# Patient Record
Sex: FEMALE | Race: WHITE | NOT HISPANIC OR LATINO | URBAN - METROPOLITAN AREA
[De-identification: names, ages, dates, MRNs, and addresses within clinical notes are randomized per-mention and may not be internally consistent; named-entity substitution may affect disease eponyms.]

---

## 2017-09-01 ENCOUNTER — APPOINTMENT (RX ONLY)
Dept: URBAN - METROPOLITAN AREA CLINIC 299 | Facility: CLINIC | Age: 42
Setting detail: DERMATOLOGY
End: 2017-09-01

## 2017-09-01 VITALS — HEIGHT: 64 IN | WEIGHT: 215 LBS

## 2017-09-01 DIAGNOSIS — L81.1 CHLOASMA: ICD-10-CM

## 2017-09-01 DIAGNOSIS — L20.89 OTHER ATOPIC DERMATITIS: ICD-10-CM

## 2017-09-01 PROBLEM — L70.0 ACNE VULGARIS: Status: ACTIVE | Noted: 2017-09-01

## 2017-09-01 PROBLEM — L20.84 INTRINSIC (ALLERGIC) ECZEMA: Status: ACTIVE | Noted: 2017-09-01

## 2017-09-01 PROCEDURE — ? PATIENT SPECIFIC COUNSELING

## 2017-09-01 PROCEDURE — ? COUNSELING

## 2017-09-01 PROCEDURE — ? PRESCRIPTION

## 2017-09-01 PROCEDURE — 99202 OFFICE O/P NEW SF 15 MIN: CPT

## 2017-09-01 PROCEDURE — ? OBSERVATION

## 2017-09-01 RX ORDER — TRIAMCINOLONE ACETONIDE 5 MG/G
OINTMENT TOPICAL
Qty: 3 | Refills: 3 | Status: ERX | COMMUNITY
Start: 2017-09-01

## 2017-09-01 RX ADMIN — TRIAMCINOLONE ACETONIDE: 5 OINTMENT TOPICAL at 15:52

## 2017-09-01 ASSESSMENT — LOCATION ZONE DERM
LOCATION ZONE: ARM
LOCATION ZONE: LEG
LOCATION ZONE: TRUNK
LOCATION ZONE: FACE

## 2017-09-01 ASSESSMENT — LOCATION DETAILED DESCRIPTION DERM
LOCATION DETAILED: RIGHT ANTERIOR PROXIMAL UPPER ARM
LOCATION DETAILED: LEFT ANTERIOR PROXIMAL UPPER ARM
LOCATION DETAILED: LEFT BUTTOCK
LOCATION DETAILED: LEFT POPLITEAL SKIN
LOCATION DETAILED: RIGHT POPLITEAL SKIN
LOCATION DETAILED: LEFT MEDIAL FOREHEAD

## 2017-09-01 ASSESSMENT — LOCATION SIMPLE DESCRIPTION DERM
LOCATION SIMPLE: LEFT POPLITEAL SKIN
LOCATION SIMPLE: RIGHT POPLITEAL SKIN
LOCATION SIMPLE: RIGHT UPPER ARM
LOCATION SIMPLE: LEFT BUTTOCK
LOCATION SIMPLE: LEFT FOREHEAD
LOCATION SIMPLE: LEFT UPPER ARM

## 2017-09-01 NOTE — PROCEDURE: MIPS QUALITY
Quality 402: Tobacco Use And Help With Quitting Among Adolescents: Patient screened for tobacco and never smoked
Quality 130: Documentation Of Current Medications In The Medical Record: Current Medications Documented
Quality 128: Preventive Care And Screening: Body Mass Index (Bmi) Screening And Follow-Up Plan: BMI is documented as being outside of normal limits, follow-up plan is not documented, documentation the patient is not eligible.
Detail Level: Detailed
Quality 431: Preventive Care And Screening: Unhealthy Alcohol Use - Screening: Patient screened for unhealthy alcohol use using a single question and scores less than 2 times per year

## 2017-09-01 NOTE — PROCEDURE: PATIENT SPECIFIC COUNSELING
Detail Level: Detailed
Pt will consider punch bx if not improved with tx, pt declines this for now. Recommended cream instead of lotion for moisturizer, and gentle, fragrance free products. D/C mupirocin and clotrimazole/betamethasone from [facility] in [place]
Recommended spa consult for laser tx. Pt will try hydroquinone lightening pads 6% from spa

## 2017-09-18 ENCOUNTER — APPOINTMENT (RX ONLY)
Dept: URBAN - METROPOLITAN AREA CLINIC 299 | Facility: CLINIC | Age: 42
Setting detail: DERMATOLOGY
End: 2017-09-18

## 2017-09-18 VITALS — WEIGHT: 220 LBS | HEIGHT: 64 IN

## 2017-09-18 DIAGNOSIS — L20.89 OTHER ATOPIC DERMATITIS: ICD-10-CM

## 2017-09-18 PROBLEM — L20.84 INTRINSIC (ALLERGIC) ECZEMA: Status: ACTIVE | Noted: 2017-09-18

## 2017-09-18 PROCEDURE — ? COUNSELING

## 2017-09-18 PROCEDURE — 99213 OFFICE O/P EST LOW 20 MIN: CPT

## 2017-09-18 PROCEDURE — ? PATIENT SPECIFIC COUNSELING

## 2017-09-18 ASSESSMENT — LOCATION ZONE DERM
LOCATION ZONE: ARM
LOCATION ZONE: TRUNK
LOCATION ZONE: LEG

## 2017-09-18 ASSESSMENT — LOCATION SIMPLE DESCRIPTION DERM
LOCATION SIMPLE: LEFT POPLITEAL SKIN
LOCATION SIMPLE: LEFT UPPER ARM
LOCATION SIMPLE: LEFT BUTTOCK
LOCATION SIMPLE: RIGHT POPLITEAL SKIN
LOCATION SIMPLE: RIGHT UPPER ARM

## 2017-09-18 ASSESSMENT — LOCATION DETAILED DESCRIPTION DERM
LOCATION DETAILED: RIGHT ANTERIOR PROXIMAL UPPER ARM
LOCATION DETAILED: RIGHT POPLITEAL SKIN
LOCATION DETAILED: LEFT ANTERIOR PROXIMAL UPPER ARM
LOCATION DETAILED: LEFT BUTTOCK
LOCATION DETAILED: LEFT POPLITEAL SKIN

## 2017-09-18 ASSESSMENT — SEVERITY ASSESSMENT: SEVERITY: ALMOST CLEAR

## 2017-09-18 NOTE — PROCEDURE: MIPS QUALITY
Quality 130: Documentation Of Current Medications In The Medical Record: Current Medications Documented
Quality 402: Tobacco Use And Help With Quitting Among Adolescents: Patient screened for tobacco and never smoked
Quality 431: Preventive Care And Screening: Unhealthy Alcohol Use - Screening: Patient screened for unhealthy alcohol use using a single question and scores less than 2 times per year
Detail Level: Detailed
Quality 128: Preventive Care And Screening: Body Mass Index (Bmi) Screening And Follow-Up Plan: BMI is documented as being outside of normal limits, follow-up plan is not documented, documentation the patient is not eligible.

## 2017-12-06 ENCOUNTER — APPOINTMENT (RX ONLY)
Dept: URBAN - METROPOLITAN AREA CLINIC 299 | Facility: CLINIC | Age: 42
Setting detail: DERMATOLOGY
End: 2017-12-06

## 2017-12-06 DIAGNOSIS — B00.1 HERPESVIRAL VESICULAR DERMATITIS: ICD-10-CM

## 2017-12-06 DIAGNOSIS — L20.89 OTHER ATOPIC DERMATITIS: ICD-10-CM

## 2017-12-06 PROBLEM — L20.84 INTRINSIC (ALLERGIC) ECZEMA: Status: ACTIVE | Noted: 2017-12-06

## 2017-12-06 PROCEDURE — ? COUNSELING

## 2017-12-06 PROCEDURE — 99213 OFFICE O/P EST LOW 20 MIN: CPT

## 2017-12-06 PROCEDURE — ? PRESCRIPTION

## 2017-12-06 PROCEDURE — ? PATIENT SPECIFIC COUNSELING

## 2017-12-06 RX ORDER — VALACYCLOVIR HYDROCHLORIDE 1 G/1
TABLET, FILM COATED ORAL
Qty: 30 | Refills: 2 | Status: ERX | COMMUNITY
Start: 2017-12-06

## 2017-12-06 RX ADMIN — VALACYCLOVIR HYDROCHLORIDE: 1 TABLET, FILM COATED ORAL at 21:13

## 2017-12-06 ASSESSMENT — LOCATION SIMPLE DESCRIPTION DERM
LOCATION SIMPLE: RIGHT LIP
LOCATION SIMPLE: LEFT BUTTOCK

## 2017-12-06 ASSESSMENT — LOCATION ZONE DERM
LOCATION ZONE: LIP
LOCATION ZONE: TRUNK

## 2017-12-06 ASSESSMENT — LOCATION DETAILED DESCRIPTION DERM
LOCATION DETAILED: LEFT BUTTOCK
LOCATION DETAILED: RIGHT SUPERIOR VERMILION LIP

## 2017-12-06 NOTE — PROCEDURE: PATIENT SPECIFIC COUNSELING
Detail Level: Zone
Patient uses TAC 0.5% cream prn which works well
Patient also uses acyclovir 5% ointment, no Rx needed today

## 2017-12-06 NOTE — PROCEDURE: MIPS QUALITY
Detail Level: Detailed
Quality 431: Preventive Care And Screening: Unhealthy Alcohol Use - Screening: Patient screened for unhealthy alcohol use using a single question and scores less than 2 times per year
Quality 110: Preventive Care And Screening: Influenza Immunization: Influenza immunization was not ordered or administered, reason not given
Quality 402: Tobacco Use And Help With Quitting Among Adolescents: Patient screened for tobacco and never smoked
Quality 130: Documentation Of Current Medications In The Medical Record: Current Medications Documented
Quality 128: Preventive Care And Screening: Body Mass Index (Bmi) Screening And Follow-Up Plan: BMI is documented as being outside of normal limits, follow-up plan is not documented, documentation the patient is not eligible.

## 2019-05-21 ENCOUNTER — APPOINTMENT (RX ONLY)
Dept: URBAN - METROPOLITAN AREA CLINIC 299 | Facility: CLINIC | Age: 44
Setting detail: DERMATOLOGY
End: 2019-05-21

## 2019-05-21 DIAGNOSIS — B00.1 HERPESVIRAL VESICULAR DERMATITIS: ICD-10-CM

## 2019-05-21 DIAGNOSIS — R21 RASH AND OTHER NONSPECIFIC SKIN ERUPTION: ICD-10-CM

## 2019-05-21 PROCEDURE — 99202 OFFICE O/P NEW SF 15 MIN: CPT | Mod: 25

## 2019-05-21 PROCEDURE — 11104 PUNCH BX SKIN SINGLE LESION: CPT

## 2019-05-21 PROCEDURE — ? COUNSELING

## 2019-05-21 PROCEDURE — ? PRESCRIPTION

## 2019-05-21 PROCEDURE — ? BIOPSY BY PUNCH METHOD

## 2019-05-21 RX ORDER — VALACYCLOVIR HYDROCHLORIDE 1 G/1
TABLET, FILM COATED ORAL Q12 HOURS
Qty: 30 | Refills: 1 | Status: ERX | COMMUNITY
Start: 2019-05-21

## 2019-05-21 RX ORDER — CLOBETASOL PROPIONATE 0.5 MG/G
CREAM TOPICAL BID
Qty: 1 | Refills: 0 | Status: ERX | COMMUNITY
Start: 2019-05-21

## 2019-05-21 RX ADMIN — VALACYCLOVIR HYDROCHLORIDE: 1 TABLET, FILM COATED ORAL at 20:03

## 2019-05-21 RX ADMIN — CLOBETASOL PROPIONATE: 0.5 CREAM TOPICAL at 20:19

## 2019-05-21 ASSESSMENT — LOCATION ZONE DERM
LOCATION ZONE: LIP
LOCATION ZONE: TRUNK

## 2019-05-21 ASSESSMENT — LOCATION SIMPLE DESCRIPTION DERM
LOCATION SIMPLE: RIGHT BUTTOCK
LOCATION SIMPLE: RIGHT LIP

## 2019-05-21 ASSESSMENT — LOCATION DETAILED DESCRIPTION DERM
LOCATION DETAILED: RIGHT BUTTOCK
LOCATION DETAILED: RIGHT INFERIOR VERMILION LIP

## 2019-05-21 NOTE — HPI: INFECTION (HERPES SIMPLEX)
How Severe Is It?: mild
Is This A New Presentation, Or A Follow-Up?: Follow Up Herpes Simplex
Additional History: Pt is here for Rx refill on her Valtrex.

## 2019-06-04 ENCOUNTER — APPOINTMENT (RX ONLY)
Dept: URBAN - METROPOLITAN AREA CLINIC 299 | Facility: CLINIC | Age: 44
Setting detail: DERMATOLOGY
End: 2019-06-04

## 2019-06-04 DIAGNOSIS — L20.89 OTHER ATOPIC DERMATITIS: ICD-10-CM | Status: RESOLVING

## 2019-06-04 DIAGNOSIS — Z48.02 ENCOUNTER FOR REMOVAL OF SUTURES: ICD-10-CM

## 2019-06-04 PROBLEM — L20.84 INTRINSIC (ALLERGIC) ECZEMA: Status: ACTIVE | Noted: 2019-06-04

## 2019-06-04 PROCEDURE — ? SUTURE REMOVAL (GLOBAL PERIOD)

## 2019-06-04 PROCEDURE — ? TREATMENT REGIMEN

## 2019-06-04 PROCEDURE — 99213 OFFICE O/P EST LOW 20 MIN: CPT

## 2019-06-04 PROCEDURE — ? COUNSELING

## 2019-06-04 ASSESSMENT — LOCATION ZONE DERM: LOCATION ZONE: TRUNK

## 2019-06-04 ASSESSMENT — LOCATION SIMPLE DESCRIPTION DERM: LOCATION SIMPLE: RIGHT BUTTOCK

## 2019-06-04 ASSESSMENT — LOCATION DETAILED DESCRIPTION DERM: LOCATION DETAILED: RIGHT BUTTOCK

## 2019-06-04 NOTE — PROCEDURE: TREATMENT REGIMEN
Detail Level: Zone
Otc Regimen: Vaseline or aquaphor
Continue Regimen: Clobetasol 0.05% cream prn flares

## 2019-06-04 NOTE — PROCEDURE: SUTURE REMOVAL (GLOBAL PERIOD)
Detail Level: Detailed
Add 81510 Cpt? (Important Note: In 2017 The Use Of 74006 Is Being Tracked By Cms To Determine Future Global Period Reimbursement For Global Periods): no

## 2020-02-11 ENCOUNTER — APPOINTMENT (RX ONLY)
Dept: URBAN - METROPOLITAN AREA CLINIC 299 | Facility: CLINIC | Age: 45
Setting detail: DERMATOLOGY
End: 2020-02-11

## 2020-02-11 DIAGNOSIS — B00.1 HERPESVIRAL VESICULAR DERMATITIS: ICD-10-CM

## 2020-02-11 PROCEDURE — ? FULL BODY SKIN EXAM - DECLINED

## 2020-02-11 PROCEDURE — ? COUNSELING

## 2020-02-11 PROCEDURE — 99213 OFFICE O/P EST LOW 20 MIN: CPT

## 2020-02-11 PROCEDURE — ? PRESCRIPTION

## 2020-02-11 RX ORDER — ACYCLOVIR 50 MG/G
OINTMENT TOPICAL
Qty: 1 | Refills: 5 | Status: ERX | COMMUNITY
Start: 2020-02-11

## 2020-02-11 RX ADMIN — ACYCLOVIR: 50 OINTMENT TOPICAL at 00:00

## 2020-02-11 ASSESSMENT — LOCATION DETAILED DESCRIPTION DERM
LOCATION DETAILED: RIGHT INFERIOR VERMILION LIP
LOCATION DETAILED: RIGHT CRUS OF HELIX
LOCATION DETAILED: LEFT CAVUM CONCHA

## 2020-02-11 ASSESSMENT — LOCATION ZONE DERM
LOCATION ZONE: LIP
LOCATION ZONE: EAR

## 2020-02-11 ASSESSMENT — LOCATION SIMPLE DESCRIPTION DERM
LOCATION SIMPLE: RIGHT EAR
LOCATION SIMPLE: RIGHT LIP
LOCATION SIMPLE: LEFT EAR

## 2020-08-03 RX ORDER — VALACYCLOVIR HYDROCHLORIDE 500 MG/1
TABLET, FILM COATED ORAL
Qty: 30 | Refills: 3 | Status: ERX

## 2021-01-29 RX ORDER — VALACYCLOVIR HYDROCHLORIDE 500 MG/1
TABLET, FILM COATED ORAL
Qty: 30 | Refills: 0 | Status: ERX

## 2021-02-26 ENCOUNTER — APPOINTMENT (RX ONLY)
Dept: URBAN - METROPOLITAN AREA CLINIC 299 | Facility: CLINIC | Age: 46
Setting detail: DERMATOLOGY
End: 2021-02-26

## 2021-02-26 DIAGNOSIS — B00.1 HERPESVIRAL VESICULAR DERMATITIS: ICD-10-CM | Status: WELL CONTROLLED

## 2021-02-26 PROCEDURE — ? PRESCRIPTION

## 2021-02-26 PROCEDURE — ? FULL BODY SKIN EXAM - DECLINED

## 2021-02-26 PROCEDURE — ? COUNSELING

## 2021-02-26 PROCEDURE — 99213 OFFICE O/P EST LOW 20 MIN: CPT

## 2021-02-26 RX ORDER — VALACYCLOVIR HYDROCHLORIDE 500 MG/1
TABLET, FILM COATED ORAL
Qty: 90 | Refills: 3 | Status: ERX

## 2021-02-26 ASSESSMENT — LOCATION SIMPLE DESCRIPTION DERM
LOCATION SIMPLE: RIGHT EAR
LOCATION SIMPLE: RIGHT LIP
LOCATION SIMPLE: LEFT EAR

## 2021-02-26 ASSESSMENT — LOCATION ZONE DERM
LOCATION ZONE: EAR
LOCATION ZONE: LIP

## 2022-03-23 ENCOUNTER — APPOINTMENT (RX ONLY)
Dept: URBAN - METROPOLITAN AREA CLINIC 299 | Facility: CLINIC | Age: 47
Setting detail: DERMATOLOGY
End: 2022-03-23

## 2022-03-23 DIAGNOSIS — L20.89 OTHER ATOPIC DERMATITIS: ICD-10-CM | Status: STABLE

## 2022-03-23 PROBLEM — L20.84 INTRINSIC (ALLERGIC) ECZEMA: Status: ACTIVE | Noted: 2022-03-23

## 2022-03-23 PROCEDURE — ? COUNSELING

## 2022-03-23 PROCEDURE — ? PRESCRIPTION

## 2022-03-23 PROCEDURE — ? FULL BODY SKIN EXAM - DECLINED

## 2022-03-23 PROCEDURE — 99214 OFFICE O/P EST MOD 30 MIN: CPT

## 2022-03-23 PROCEDURE — ? PRESCRIPTION MEDICATION MANAGEMENT

## 2022-03-23 RX ORDER — PREDNISONE 10 MG/1
10MG TABLET ORAL
Qty: 40 | Refills: 0 | Status: ERX | COMMUNITY
Start: 2022-03-23

## 2022-03-23 RX ORDER — CLOBETASOL PROPIONATE 0.5 MG/G
0.05% CREAM TOPICAL BID
Qty: 60 | Refills: 3 | Status: ERX

## 2022-03-23 RX ADMIN — PREDNISONE 10MG: 10 TABLET ORAL at 00:00

## 2022-03-23 ASSESSMENT — LOCATION ZONE DERM: LOCATION ZONE: LEG

## 2022-03-23 ASSESSMENT — LOCATION DETAILED DESCRIPTION DERM: LOCATION DETAILED: LEFT LATERAL DISTAL PRETIBIAL REGION

## 2022-03-23 ASSESSMENT — LOCATION SIMPLE DESCRIPTION DERM: LOCATION SIMPLE: LEFT PRETIBIAL REGION

## 2022-03-23 NOTE — PROCEDURE: PRESCRIPTION MEDICATION MANAGEMENT
Initiate Treatment: Prednisone 10mg 4x4 days, 3x4 days, [id number], [id number]
Continue Regimen: Clobetasol 0.05% cream
Plan: Briefly discussed Eucrisa and dupixent as well
Render In Strict Bullet Format?: No
Detail Level: Zone

## 2022-07-11 ENCOUNTER — APPOINTMENT (RX ONLY)
Dept: URBAN - METROPOLITAN AREA CLINIC 299 | Facility: CLINIC | Age: 47
Setting detail: DERMATOLOGY
End: 2022-07-11

## 2022-07-11 DIAGNOSIS — R21 RASH AND OTHER NONSPECIFIC SKIN ERUPTION: ICD-10-CM | Status: INADEQUATELY CONTROLLED

## 2022-07-11 PROCEDURE — 11102 TANGNTL BX SKIN SINGLE LES: CPT

## 2022-07-11 PROCEDURE — ? DIAGNOSIS COMMENT

## 2022-07-11 PROCEDURE — ? PRESCRIPTION

## 2022-07-11 PROCEDURE — ? BIOPSY BY SHAVE METHOD

## 2022-07-11 PROCEDURE — ? PHOTO-DOCUMENTATION

## 2022-07-11 PROCEDURE — ? TREATMENT REGIMEN

## 2022-07-11 PROCEDURE — 99214 OFFICE O/P EST MOD 30 MIN: CPT | Mod: 25

## 2022-07-11 RX ORDER — CALCIPOTRIENE 0.05 MG/G
CREAM TOPICAL
Qty: 60 | Refills: 5 | Status: ERX | COMMUNITY
Start: 2022-07-11

## 2022-07-11 RX ADMIN — CALCIPOTRIENE: 0.05 CREAM TOPICAL at 00:00

## 2022-07-11 ASSESSMENT — LOCATION SIMPLE DESCRIPTION DERM
LOCATION SIMPLE: LEFT PRETIBIAL REGION
LOCATION SIMPLE: LEFT PRETIBIAL REGION

## 2022-07-11 ASSESSMENT — LOCATION ZONE DERM
LOCATION ZONE: LEG
LOCATION ZONE: LEG

## 2022-07-11 ASSESSMENT — LOCATION DETAILED DESCRIPTION DERM
LOCATION DETAILED: LEFT DISTAL PRETIBIAL REGION
LOCATION DETAILED: LEFT DISTAL PRETIBIAL REGION

## 2022-07-11 NOTE — PROCEDURE: TREATMENT REGIMEN
Plan: -\\n- Continue Clobetasol cream but use a thin layer with wet wraps BID for up to 1 week, then stop wet wraps and continue the steroid BID until resolved. Stop when clear. Restart if flaring. Wet wrap handout was given. We discussed the potential risk of steroid atrophy.\\n- Start Calcipotriene cream BID. We discussed the option of GoodRx.\\n- Try to get 10-15 minutes of natural sunlight per day.\\n- If not much improved at the next visit, we discussed the option of ILK or briefly discussed the option for systemic therapy.\\n- Briefly discussed the increased risk of heart disease with psoriasis.
Detail Level: Zone

## 2022-07-11 NOTE — PROCEDURE: DIAGNOSIS COMMENT
Comment: Will biopsy today (as above) to confirm the diagnosis. I suspect that this is psoriasis.
Detail Level: Simple
Render Risk Assessment In Note?: no

## 2022-07-11 NOTE — PROCEDURE: BIOPSY BY SHAVE METHOD

## 2022-08-10 ENCOUNTER — APPOINTMENT (RX ONLY)
Dept: URBAN - METROPOLITAN AREA CLINIC 299 | Facility: CLINIC | Age: 47
Setting detail: DERMATOLOGY
End: 2022-08-10

## 2022-08-10 DIAGNOSIS — L81.4 OTHER MELANIN HYPERPIGMENTATION: ICD-10-CM

## 2022-08-10 DIAGNOSIS — L30.8 OTHER SPECIFIED DERMATITIS: ICD-10-CM

## 2022-08-10 PROCEDURE — ? COUNSELING

## 2022-08-10 PROCEDURE — ? SUNSCREEN RECOMMENDATIONS

## 2022-08-10 PROCEDURE — 99213 OFFICE O/P EST LOW 20 MIN: CPT

## 2022-08-10 PROCEDURE — ? ADDITIONAL NOTES

## 2022-08-10 ASSESSMENT — LOCATION DETAILED DESCRIPTION DERM
LOCATION DETAILED: LEFT ANTERIOR DISTAL THIGH
LOCATION DETAILED: RIGHT ANTERIOR DISTAL THIGH
LOCATION DETAILED: RIGHT PROXIMAL DORSAL FOREARM
LOCATION DETAILED: LEFT PROXIMAL PRETIBIAL REGION
LOCATION DETAILED: RIGHT VENTRAL DISTAL FOREARM
LOCATION DETAILED: RIGHT PROXIMAL PRETIBIAL REGION
LOCATION DETAILED: LEFT VENTRAL DISTAL FOREARM
LOCATION DETAILED: LEFT PROXIMAL DORSAL FOREARM

## 2022-08-10 ASSESSMENT — LOCATION SIMPLE DESCRIPTION DERM
LOCATION SIMPLE: RIGHT FOREARM
LOCATION SIMPLE: RIGHT THIGH
LOCATION SIMPLE: LEFT PRETIBIAL REGION
LOCATION SIMPLE: RIGHT PRETIBIAL REGION
LOCATION SIMPLE: LEFT THIGH
LOCATION SIMPLE: LEFT FOREARM

## 2022-08-10 ASSESSMENT — LOCATION ZONE DERM
LOCATION ZONE: LEG
LOCATION ZONE: ARM

## 2022-08-10 NOTE — PROCEDURE: ADDITIONAL NOTES
Detail Level: Detailed
Additional Notes: Biopsy proven. Histologic differential diagnosis includes entities such as drug eruption, photoallergic dermatitis, and polymorphous light eruption. Pt denies taking any medications including OTCs. She feels like it is stable with using clobetasol cream for now. Discussed ILK, prednisone, another bx or a second opinion if it worsens
Render Risk Assessment In Note?: no